# Patient Record
Sex: FEMALE | Race: WHITE | Employment: FULL TIME | URBAN - METROPOLITAN AREA
[De-identification: names, ages, dates, MRNs, and addresses within clinical notes are randomized per-mention and may not be internally consistent; named-entity substitution may affect disease eponyms.]

---

## 2022-07-20 ENCOUNTER — HOSPITAL ENCOUNTER (EMERGENCY)
Age: 27
Discharge: HOME | End: 2022-07-20
Attending: EMERGENCY MEDICINE
Payer: COMMERCIAL

## 2022-07-20 VITALS
OXYGEN SATURATION: 99 % | SYSTOLIC BLOOD PRESSURE: 120 MMHG | DIASTOLIC BLOOD PRESSURE: 69 MMHG | HEART RATE: 70 BPM | RESPIRATION RATE: 16 BRPM | TEMPERATURE: 97.5 F

## 2022-07-20 DIAGNOSIS — R51.9 ACUTE NONINTRACTABLE HEADACHE, UNSPECIFIED HEADACHE TYPE: ICD-10-CM

## 2022-07-20 DIAGNOSIS — R11.2 NON-INTRACTABLE VOMITING WITH NAUSEA, UNSPECIFIED VOMITING TYPE: ICD-10-CM

## 2022-07-20 DIAGNOSIS — H92.03 OTALGIA OF BOTH EARS: Primary | ICD-10-CM

## 2022-07-20 DIAGNOSIS — J02.9 ACUTE PHARYNGITIS, UNSPECIFIED ETIOLOGY: ICD-10-CM

## 2022-07-20 PROCEDURE — 87430 STREP A AG IA: CPT

## 2022-07-20 PROCEDURE — 87081 CULTURE SCREEN ONLY: CPT

## 2022-07-20 PROCEDURE — 6370000000 HC RX 637 (ALT 250 FOR IP): Performed by: EMERGENCY MEDICINE

## 2022-07-20 PROCEDURE — 99283 EMERGENCY DEPT VISIT LOW MDM: CPT

## 2022-07-20 RX ORDER — ACETAMINOPHEN 325 MG/1
650 TABLET ORAL ONCE
Status: COMPLETED | OUTPATIENT
Start: 2022-07-20 | End: 2022-07-20

## 2022-07-20 RX ORDER — IBUPROFEN 400 MG/1
600 TABLET ORAL ONCE
Status: COMPLETED | OUTPATIENT
Start: 2022-07-20 | End: 2022-07-20

## 2022-07-20 RX ORDER — AMOXICILLIN 500 MG/1
500 CAPSULE ORAL 2 TIMES DAILY
Qty: 20 CAPSULE | Refills: 0 | Status: SHIPPED | OUTPATIENT
Start: 2022-07-20 | End: 2022-07-30

## 2022-07-20 RX ORDER — ONDANSETRON 4 MG/1
4 TABLET, ORALLY DISINTEGRATING ORAL ONCE
Status: COMPLETED | OUTPATIENT
Start: 2022-07-20 | End: 2022-07-20

## 2022-07-20 RX ORDER — ONDANSETRON 4 MG/1
4 TABLET, ORALLY DISINTEGRATING ORAL 3 TIMES DAILY PRN
Qty: 21 TABLET | Refills: 0 | Status: SHIPPED | OUTPATIENT
Start: 2022-07-20

## 2022-07-20 RX ADMIN — ONDANSETRON 4 MG: 4 TABLET, ORALLY DISINTEGRATING ORAL at 17:54

## 2022-07-20 RX ADMIN — ACETAMINOPHEN 650 MG: 325 TABLET ORAL at 17:53

## 2022-07-20 RX ADMIN — IBUPROFEN 600 MG: 400 TABLET, FILM COATED ORAL at 17:54

## 2022-07-20 ASSESSMENT — PAIN SCALES - GENERAL
PAINLEVEL_OUTOF10: 9
PAINLEVEL_OUTOF10: 6

## 2022-07-20 ASSESSMENT — PAIN - FUNCTIONAL ASSESSMENT: PAIN_FUNCTIONAL_ASSESSMENT: 0-10

## 2022-07-20 ASSESSMENT — PAIN DESCRIPTION - DESCRIPTORS: DESCRIPTORS: ACHING

## 2022-07-20 ASSESSMENT — PAIN DESCRIPTION - LOCATION
LOCATION: HEAD;THROAT
LOCATION: HEAD;THROAT
LOCATION: EAR;THROAT

## 2022-07-20 ASSESSMENT — PAIN DESCRIPTION - ORIENTATION: ORIENTATION: RIGHT;LEFT

## 2022-07-20 NOTE — Clinical Note
Mansi Ramirez was seen and treated in our emergency department on 7/20/2022. She may return to work on 07/24/2022. If you have any questions or concerns, please don't hesitate to call.       Alessandro Meraz Dr, DO

## 2022-07-20 NOTE — ED NOTES
Discharge instructions and prescriptions given, pt voiced understanding. Ambulatory to exit without incident.      Lisseth Guzmán RN  07/20/22 6137

## 2022-07-20 NOTE — DISCHARGE INSTRUCTIONS
Establish and follow-up with your primary care physician for reevaluation.   Call for an appointment

## 2022-07-20 NOTE — ED PROVIDER NOTES
Emergency Department Encounter    Patient: Johanna Quinonez  MRN: [id number]  : 1995  Date of Evaluation: 2022  ED Provider:  Benton Cabot, DO    Triage Chief Complaint:   Otalgia (Pt reports bilateral ear pain, sore throat, body aches, headache since last Friday. Went to Urgent Care and was told she had a virus. Pt reports she does not feel any better. Negative home COVID , negative COVID at urgent care Monday. Ambulated to bed per self, AOx4, breathing unlabored, skin warm and dry, no distress noted. )    [place]:  Johanna Quinonez is a 31 y.o. female that presents to the emergency department complaining of headache body aches sore throat earache. Patient states symptoms started this past Friday 5 days ago. Patient states  or Monday she had negative COVID-19 test at the urgent care. Patient has symptoms still persist.  Baseline now she is having some nausea and vomiting. Patient states they did not test her for strep throat. Patient says she has been try to take some Tylenol Motrin and NyQuil for symptoms which only helps small while in the neck symptoms come back. Patient states a relative was sick but states they are feeling better now. Patient here for evaluation.     ROS - see HPI, below listed is current ROS at time of my eval:  General:  No fevers, no chills, no weakness  Eyes:  No recent vison changes, no discharge  ENT: Positive for sore throat, positive for bilateral ear pain, no nasal congestion, no hearing changes  Cardiovascular:  No chest pain, no palpitations  Respiratory:  No shortness of breath, no cough, no wheezing  Gastrointestinal:  No pain, no nausea, no vomiting, no diarrhea  Musculoskeletal:  No muscle pain, no joint pain  Skin:  No rash, no pruritis, no easy bruising  Neurologic:  No speech problems, positive for headache, no extremity numbness, no extremity tingling, no extremity weakness  Psychiatric:  No anxiety  Genitourinary:  No dysuria, no hematuria  Endocrine:  No unexpected weight gain, no unexpected weight loss  Extremities:  no edema, no pain    History reviewed. No pertinent past medical history. Past Surgical History:   Procedure Laterality Date    EYE SURGERY Left      History reviewed. No pertinent family history. Social History     Socioeconomic History    Marital status: Single     Spouse name: Not on file    Number of children: Not on file    Years of education: Not on file    Highest education level: Not on file   Occupational History    Not on file   Tobacco Use    Smoking status: Never     Passive exposure: Never    Smokeless tobacco: Never   Vaping Use    Vaping Use: Never used   Substance and Sexual Activity    Alcohol use: Yes     Comment: rare    Drug use: Yes     Types: Marijuana Azalea Kael)    Sexual activity: Not on file   Other Topics Concern    Not on file   Social History Narrative    Not on file     Social Determinants of Health     Financial Resource Strain: Not on file   Food Insecurity: Not on file   Transportation Needs: Not on file   Physical Activity: Not on file   Stress: Not on file   Social Connections: Not on file   Intimate Partner Violence: Not on file   Housing Stability: Not on file     Current Facility-Administered Medications   Medication Dose Route Frequency Provider Last Rate Last Admin    ondansetron (ZOFRAN-ODT) disintegrating tablet 4 mg  4 mg Oral Once Candida Mohr, DO        acetaminophen (TYLENOL) tablet 650 mg  650 mg Oral Once [employer], DO        ibuprofen (ADVIL;MOTRIN) tablet 600 mg  600 mg Oral Once [employer], DO         No current outpatient medications on file.      Allergies   Allergen Reactions    Tree Nut [Macadamia Nut Oil] Anaphylaxis and Swelling       Nursing Notes Reviewed    Physical Exam:  Triage VS:    ED Triage Vitals [07/20/22 1651]   Enc Vitals Group      /69      Heart Rate 70      Resp 16      Temp 97.5 °F (36.4 °C)      Temp Source Infrared      SpO2 99 %      Weight       Height       Head Circumference       Peak Flow       Pain Score       Pain Loc       Pain Edu? Excl. in [address]? My pulse ox interpretation is - normal    General appearance:  No acute distress. Skin:  Warm. Dry. Eye:  Extraocular movements intact. Ears, nose, mouth and throat:  Oral mucosa moist erythema of the posterior oropharynx noted, no exudates, uvula midline, no uvular or tongue swelling, normal tympanic membranes bilaterally,  Neck:  Trachea midline. Extremity:  No swelling. Normal ROM     Heart:  Regular rate and rhythm, normal S1 & S2, no extra heart sounds. Perfusion:  intact  Respiratory:  Lungs clear to auscultation bilaterally. Respirations nonlabored. Abdominal:  Normal bowel sounds. Soft. Nontender. Non distended. Back:  No CVA tenderness to palpation     Neurological:  Alert and oriented times 3. No focal neuro deficits. Psychiatric:  Appropriate    I have reviewed and interpreted all of the currently available lab results from this visit (if applicable):  Results for orders placed or performed during the hospital encounter of 07/20/22   Strep Screen Group A  - Throat    Specimen: Throat   Result Value Ref Range    Specimen THROAT     Special Requests NONE     Strep A Direct Screen NEGATIVE       Radiographs (if obtained):  Radiologist's Report Reviewed:  No results found. EKG (if obtained): (All EKG's are interpreted by myself in the absence of a cardiologist)      MDM:  Patient presents emergency department complaining of headache body aches sore throat nausea vomiting earache. Patient here for reevaluation. Patient with recent negative COVID test told she had a viral URI. Patient is admitted has been taking Tylenol Motrin NyQuil per patient. Patient did have episode nausea vomiting in the emergency department. She was ordered Zofran ODT. Patient with normal tympanic membranes bilaterally.   Patient does have some erythema of the posterior oropharynx. Strep test was ordered. Patient was also ordered Tylenol and Motrin for pain aches and headache. Strep screen is negative. Being that the patient has the erythema of the posterior oropharynx  and ear pain she will be treated amoxicillin. Strep culture pending. Patient is to continue her Tylenol and Motrin and NyQuil and DayQuil. Drink plenty of fluids get some rest.  Patient will be given work excuse. Patient is return immediately for any worsening symptoms. All questions answered. Clinical Impression:  1. Otalgia of both ears    2. Acute pharyngitis, unspecified etiology    3. Non-intractable vomiting with nausea, unspecified vomiting type    4. Acute nonintractable headache, unspecified headache type      Disposition referral (if applicable): [facility] [place] [address]  [phone]  Schedule an appointment as soon as possible for a visit in 2 days  Establish a primary care physician for reevaluation. Call for an appointment    [address]  [phone]  Go in 1 day  If symptoms worsen  Disposition medications (if applicable):  New Prescriptions    AMOXICILLIN (AMOXIL) 500 MG CAPSULE    Take 1 capsule by mouth in the morning and 1 capsule before bedtime. Do all this for 10 days. ONDANSETRON (ZOFRAN-ODT) 4 MG DISINTEGRATING TABLET    Take 1 tablet by mouth 3 times daily as needed for Nausea or Vomiting     ED Provider Disposition Time  DISPOSITION  6:42 PM        Comment: Please note this report has been produced using speech recognition software and may contain errors related to that system including errors in grammar, punctuation, and spelling, as well as words and phrases that may be inappropriate. Efforts were made to edit the dictations.         Amina Prater, DO  07/20/22 [address] Silas Wyatt, DO  07/20/22 2385

## 2022-07-23 LAB
CULTURE: NORMAL
Lab: NORMAL
SPECIMEN: NORMAL
STREP A DIRECT SCREEN: NEGATIVE